# Patient Record
Sex: FEMALE | Race: WHITE | NOT HISPANIC OR LATINO | Employment: FULL TIME
[De-identification: names, ages, dates, MRNs, and addresses within clinical notes are randomized per-mention and may not be internally consistent; named-entity substitution may affect disease eponyms.]

---

## 2017-08-05 ENCOUNTER — HEALTH MAINTENANCE LETTER (OUTPATIENT)
Age: 47
End: 2017-08-05

## 2020-02-16 ENCOUNTER — HEALTH MAINTENANCE LETTER (OUTPATIENT)
Age: 50
End: 2020-02-16

## 2025-07-31 ENCOUNTER — ANESTHESIA (OUTPATIENT)
Dept: SURGERY | Facility: HOSPITAL | Age: 55
End: 2025-07-31
Payer: COMMERCIAL

## 2025-07-31 ENCOUNTER — ANESTHESIA EVENT (OUTPATIENT)
Dept: SURGERY | Facility: HOSPITAL | Age: 55
End: 2025-07-31
Payer: COMMERCIAL

## 2025-07-31 ENCOUNTER — HOSPITAL ENCOUNTER (INPATIENT)
Facility: HOSPITAL | Age: 55
End: 2025-07-31
Attending: SPECIALIST | Admitting: HOSPITALIST
Payer: COMMERCIAL

## 2025-07-31 VITALS
SYSTOLIC BLOOD PRESSURE: 117 MMHG | OXYGEN SATURATION: 95 % | BODY MASS INDEX: 33.29 KG/M2 | HEART RATE: 66 BPM | RESPIRATION RATE: 16 BRPM | WEIGHT: 212.08 LBS | HEIGHT: 67 IN | TEMPERATURE: 97.8 F | DIASTOLIC BLOOD PRESSURE: 56 MMHG

## 2025-07-31 DIAGNOSIS — K81.0 ACUTE CHOLECYSTITIS: Primary | ICD-10-CM

## 2025-07-31 DIAGNOSIS — K85.10 GALLSTONE PANCREATITIS: Primary | ICD-10-CM

## 2025-07-31 LAB
CREAT SERPL-MCNC: 0.69 MG/DL (ref 0.51–0.95)
EGFRCR SERPLBLD CKD-EPI 2021: >90 ML/MIN/1.73M2
MAGNESIUM SERPL-MCNC: 1.8 MG/DL (ref 1.7–2.3)
MAGNESIUM SERPL-MCNC: 1.8 MG/DL (ref 1.7–2.3)
POTASSIUM SERPL-SCNC: 3.6 MMOL/L (ref 3.4–5.3)

## 2025-07-31 PROCEDURE — 99222 1ST HOSP IP/OBS MODERATE 55: CPT | Mod: FS | Performed by: SPECIALIST

## 2025-07-31 PROCEDURE — 36415 COLL VENOUS BLD VENIPUNCTURE: CPT | Performed by: HOSPITALIST

## 2025-07-31 PROCEDURE — 250N000013 HC RX MED GY IP 250 OP 250 PS 637: Performed by: SPECIALIST

## 2025-07-31 PROCEDURE — 82565 ASSAY OF CREATININE: CPT | Performed by: HOSPITALIST

## 2025-07-31 PROCEDURE — 250N000011 HC RX IP 250 OP 636: Performed by: HOSPITALIST

## 2025-07-31 PROCEDURE — 258N000003 HC RX IP 258 OP 636: Performed by: HOSPITALIST

## 2025-07-31 PROCEDURE — 999N000141 HC STATISTIC PRE-PROCEDURE NURSING ASSESSMENT: Performed by: SPECIALIST

## 2025-07-31 PROCEDURE — 84132 ASSAY OF SERUM POTASSIUM: CPT | Performed by: HOSPITALIST

## 2025-07-31 PROCEDURE — 99207 PR APP CREDIT; MD BILLING SHARED VISIT: CPT

## 2025-07-31 PROCEDURE — 120N000001 HC R&B MED SURG/OB

## 2025-07-31 PROCEDURE — 99223 1ST HOSP IP/OBS HIGH 75: CPT | Performed by: HOSPITALIST

## 2025-07-31 PROCEDURE — 250N000013 HC RX MED GY IP 250 OP 250 PS 637: Performed by: HOSPITALIST

## 2025-07-31 PROCEDURE — 83735 ASSAY OF MAGNESIUM: CPT | Performed by: HOSPITALIST

## 2025-07-31 RX ORDER — CEFAZOLIN SODIUM/WATER 2 G/20 ML
2 SYRINGE (ML) INTRAVENOUS
Status: DISCONTINUED | OUTPATIENT
Start: 2025-07-31 | End: 2025-07-31 | Stop reason: HOSPADM

## 2025-07-31 RX ORDER — CEFAZOLIN SODIUM/WATER 2 G/20 ML
2 SYRINGE (ML) INTRAVENOUS SEE ADMIN INSTRUCTIONS
Status: DISCONTINUED | OUTPATIENT
Start: 2025-07-31 | End: 2025-07-31 | Stop reason: HOSPADM

## 2025-07-31 RX ORDER — HYDRALAZINE HYDROCHLORIDE 20 MG/ML
10 INJECTION INTRAMUSCULAR; INTRAVENOUS EVERY 4 HOURS PRN
Status: CANCELLED | OUTPATIENT
Start: 2025-07-31

## 2025-07-31 RX ORDER — HYDRALAZINE HYDROCHLORIDE 10 MG/1
10 TABLET, FILM COATED ORAL EVERY 4 HOURS PRN
Status: CANCELLED | OUTPATIENT
Start: 2025-07-31

## 2025-07-31 RX ORDER — AMOXICILLIN 250 MG
2 CAPSULE ORAL 2 TIMES DAILY PRN
Status: CANCELLED | OUTPATIENT
Start: 2025-07-31

## 2025-07-31 RX ORDER — AMOXICILLIN 250 MG
1 CAPSULE ORAL 2 TIMES DAILY PRN
Status: CANCELLED | OUTPATIENT
Start: 2025-07-31

## 2025-07-31 RX ORDER — AZELASTINE HYDROCHLORIDE 0.5 MG/ML
1 SOLUTION/ DROPS OPHTHALMIC 2 TIMES DAILY
Status: ON HOLD | COMMUNITY

## 2025-07-31 RX ORDER — NALOXONE HYDROCHLORIDE 0.4 MG/ML
0.2 INJECTION, SOLUTION INTRAMUSCULAR; INTRAVENOUS; SUBCUTANEOUS
Status: ACTIVE | OUTPATIENT
Start: 2025-07-31

## 2025-07-31 RX ORDER — ACETAMINOPHEN 325 MG/1
650 TABLET ORAL EVERY 4 HOURS PRN
Status: DISPENSED | OUTPATIENT
Start: 2025-07-31

## 2025-07-31 RX ORDER — SODIUM CHLORIDE, SODIUM LACTATE, POTASSIUM CHLORIDE, CALCIUM CHLORIDE 600; 310; 30; 20 MG/100ML; MG/100ML; MG/100ML; MG/100ML
INJECTION, SOLUTION INTRAVENOUS CONTINUOUS
Status: ACTIVE | OUTPATIENT
Start: 2025-07-31

## 2025-07-31 RX ORDER — HYDROMORPHONE HCL IN WATER/PF 6 MG/30 ML
0.2 PATIENT CONTROLLED ANALGESIA SYRINGE INTRAVENOUS
Refills: 0 | Status: ACTIVE | OUTPATIENT
Start: 2025-07-31

## 2025-07-31 RX ORDER — ACETAMINOPHEN 325 MG/1
650 TABLET ORAL EVERY 4 HOURS PRN
Status: CANCELLED | OUTPATIENT
Start: 2025-07-31

## 2025-07-31 RX ORDER — ACETAMINOPHEN 650 MG/1
650 SUPPOSITORY RECTAL EVERY 4 HOURS PRN
Status: ACTIVE | OUTPATIENT
Start: 2025-07-31

## 2025-07-31 RX ORDER — ONDANSETRON 4 MG/1
4 TABLET, ORALLY DISINTEGRATING ORAL EVERY 6 HOURS PRN
Status: CANCELLED | OUTPATIENT
Start: 2025-07-31

## 2025-07-31 RX ORDER — ACETAMINOPHEN 325 MG/1
975 TABLET ORAL ONCE
Status: DISCONTINUED | OUTPATIENT
Start: 2025-07-31 | End: 2025-07-31

## 2025-07-31 RX ORDER — NALOXONE HYDROCHLORIDE 0.4 MG/ML
0.1 INJECTION, SOLUTION INTRAMUSCULAR; INTRAVENOUS; SUBCUTANEOUS
Status: DISCONTINUED | OUTPATIENT
Start: 2025-07-31 | End: 2025-07-31 | Stop reason: HOSPADM

## 2025-07-31 RX ORDER — ONDANSETRON 4 MG/1
4 TABLET, ORALLY DISINTEGRATING ORAL EVERY 30 MIN PRN
Status: DISCONTINUED | OUTPATIENT
Start: 2025-07-31 | End: 2025-07-31 | Stop reason: HOSPADM

## 2025-07-31 RX ORDER — NALOXONE HYDROCHLORIDE 0.4 MG/ML
0.4 INJECTION, SOLUTION INTRAMUSCULAR; INTRAVENOUS; SUBCUTANEOUS
Status: ACTIVE | OUTPATIENT
Start: 2025-07-31

## 2025-07-31 RX ORDER — ONDANSETRON 2 MG/ML
4 INJECTION INTRAMUSCULAR; INTRAVENOUS EVERY 6 HOURS PRN
Status: ACTIVE | OUTPATIENT
Start: 2025-07-31

## 2025-07-31 RX ORDER — ACETAMINOPHEN 325 MG/1
975 TABLET ORAL ONCE
Status: COMPLETED | OUTPATIENT
Start: 2025-07-31 | End: 2025-07-31

## 2025-07-31 RX ORDER — ONDANSETRON 2 MG/ML
4 INJECTION INTRAMUSCULAR; INTRAVENOUS EVERY 6 HOURS PRN
Status: CANCELLED | OUTPATIENT
Start: 2025-07-31

## 2025-07-31 RX ORDER — CEFTRIAXONE 1 G/1
1 INJECTION, POWDER, FOR SOLUTION INTRAMUSCULAR; INTRAVENOUS EVERY 24 HOURS
Status: ACTIVE | OUTPATIENT
Start: 2025-08-01

## 2025-07-31 RX ORDER — METRONIDAZOLE 500 MG/100ML
500 INJECTION, SOLUTION INTRAVENOUS EVERY 12 HOURS
Status: DISPENSED | OUTPATIENT
Start: 2025-07-31

## 2025-07-31 RX ORDER — SODIUM CHLORIDE, SODIUM LACTATE, POTASSIUM CHLORIDE, CALCIUM CHLORIDE 600; 310; 30; 20 MG/100ML; MG/100ML; MG/100ML; MG/100ML
INJECTION, SOLUTION INTRAVENOUS CONTINUOUS
Status: DISCONTINUED | OUTPATIENT
Start: 2025-07-31 | End: 2025-07-31 | Stop reason: HOSPADM

## 2025-07-31 RX ORDER — HYDRALAZINE HYDROCHLORIDE 20 MG/ML
10 INJECTION INTRAMUSCULAR; INTRAVENOUS EVERY 4 HOURS PRN
Status: ACTIVE | OUTPATIENT
Start: 2025-07-31

## 2025-07-31 RX ORDER — CALCIUM CARBONATE 500 MG/1
1000 TABLET, CHEWABLE ORAL 2 TIMES DAILY PRN
Status: ACTIVE | OUTPATIENT
Start: 2025-07-31

## 2025-07-31 RX ORDER — HYDRALAZINE HYDROCHLORIDE 10 MG/1
10 TABLET, FILM COATED ORAL EVERY 4 HOURS PRN
Status: ACTIVE | OUTPATIENT
Start: 2025-07-31

## 2025-07-31 RX ORDER — HYDROMORPHONE HCL IN WATER/PF 6 MG/30 ML
0.4 PATIENT CONTROLLED ANALGESIA SYRINGE INTRAVENOUS EVERY 5 MIN PRN
Status: DISCONTINUED | OUTPATIENT
Start: 2025-07-31 | End: 2025-07-31 | Stop reason: HOSPADM

## 2025-07-31 RX ORDER — PROCHLORPERAZINE MALEATE 10 MG
10 TABLET ORAL EVERY 6 HOURS PRN
Status: CANCELLED | OUTPATIENT
Start: 2025-07-31

## 2025-07-31 RX ORDER — ACETAMINOPHEN 650 MG/1
650 SUPPOSITORY RECTAL EVERY 4 HOURS PRN
Status: CANCELLED | OUTPATIENT
Start: 2025-07-31

## 2025-07-31 RX ORDER — FENTANYL CITRATE 50 UG/ML
50 INJECTION, SOLUTION INTRAMUSCULAR; INTRAVENOUS EVERY 5 MIN PRN
Status: DISCONTINUED | OUTPATIENT
Start: 2025-07-31 | End: 2025-07-31 | Stop reason: HOSPADM

## 2025-07-31 RX ORDER — ONDANSETRON 2 MG/ML
4 INJECTION INTRAMUSCULAR; INTRAVENOUS EVERY 30 MIN PRN
Status: DISCONTINUED | OUTPATIENT
Start: 2025-07-31 | End: 2025-07-31 | Stop reason: HOSPADM

## 2025-07-31 RX ORDER — DEXAMETHASONE SODIUM PHOSPHATE 10 MG/ML
4 INJECTION, SOLUTION INTRAMUSCULAR; INTRAVENOUS
Status: DISCONTINUED | OUTPATIENT
Start: 2025-07-31 | End: 2025-07-31 | Stop reason: HOSPADM

## 2025-07-31 RX ORDER — HYDROMORPHONE HCL IN WATER/PF 6 MG/30 ML
0.4 PATIENT CONTROLLED ANALGESIA SYRINGE INTRAVENOUS
Refills: 0 | Status: ACTIVE | OUTPATIENT
Start: 2025-07-31

## 2025-07-31 RX ORDER — FENTANYL CITRATE 50 UG/ML
25 INJECTION, SOLUTION INTRAMUSCULAR; INTRAVENOUS EVERY 5 MIN PRN
Status: DISCONTINUED | OUTPATIENT
Start: 2025-07-31 | End: 2025-07-31 | Stop reason: HOSPADM

## 2025-07-31 RX ORDER — ONDANSETRON 4 MG/1
4 TABLET, ORALLY DISINTEGRATING ORAL EVERY 6 HOURS PRN
Status: ACTIVE | OUTPATIENT
Start: 2025-07-31

## 2025-07-31 RX ORDER — ACETAMINOPHEN 325 MG/1
975 TABLET ORAL ONCE
Status: CANCELLED | OUTPATIENT
Start: 2025-07-31 | End: 2025-07-31

## 2025-07-31 RX ORDER — HYDROMORPHONE HCL IN WATER/PF 6 MG/30 ML
0.2 PATIENT CONTROLLED ANALGESIA SYRINGE INTRAVENOUS EVERY 5 MIN PRN
Status: DISCONTINUED | OUTPATIENT
Start: 2025-07-31 | End: 2025-07-31 | Stop reason: HOSPADM

## 2025-07-31 RX ORDER — LIDOCAINE 40 MG/G
CREAM TOPICAL
Status: ACTIVE | OUTPATIENT
Start: 2025-07-31

## 2025-07-31 RX ADMIN — SODIUM CHLORIDE, SODIUM LACTATE, POTASSIUM CHLORIDE, AND CALCIUM CHLORIDE 500 ML: .6; .31; .03; .02 INJECTION, SOLUTION INTRAVENOUS at 18:04

## 2025-07-31 RX ADMIN — SODIUM CHLORIDE, SODIUM LACTATE, POTASSIUM CHLORIDE, AND CALCIUM CHLORIDE: .6; .31; .03; .02 INJECTION, SOLUTION INTRAVENOUS at 14:00

## 2025-07-31 RX ADMIN — METRONIDAZOLE 500 MG: 500 INJECTION, SOLUTION INTRAVENOUS at 19:08

## 2025-07-31 RX ADMIN — ACETAMINOPHEN 975 MG: 325 TABLET ORAL at 14:28

## 2025-07-31 RX ADMIN — ACETAMINOPHEN 650 MG: 325 TABLET ORAL at 21:05

## 2025-07-31 RX ADMIN — SODIUM CHLORIDE, SODIUM LACTATE, POTASSIUM CHLORIDE, AND CALCIUM CHLORIDE: .6; .31; .03; .02 INJECTION, SOLUTION INTRAVENOUS at 23:59

## 2025-07-31 ASSESSMENT — ACTIVITIES OF DAILY LIVING (ADL)
ADLS_ACUITY_SCORE: 18
ADLS_ACUITY_SCORE: 41
ADLS_ACUITY_SCORE: 18
ADLS_ACUITY_SCORE: 41
ADLS_ACUITY_SCORE: 18
ADLS_ACUITY_SCORE: 41
ADLS_ACUITY_SCORE: 41
ADLS_ACUITY_SCORE: 18
ADLS_ACUITY_SCORE: 41
ADLS_ACUITY_SCORE: 41

## 2025-07-31 NOTE — PHARMACY-ADMISSION MEDICATION HISTORY
Pharmacist Admission Medication History    Admission medication history is complete. The information provided in this note is only as accurate as the sources available at the time of the update.    Information Source(s): Patient, Clinic records, and CareEverywhere/SureScripts via in-person    Pertinent Information: Patient was able to confirm current medications and last known administration times    Changes made to PTA medication list:  Added: None  Deleted: None  Changed: None    Allergies reviewed with patient and updates made in EHR: yes    Medication History Completed By: Darrell Hackett RP 7/31/2025 1:41 PM    PTA Med List   Medication Sig Last Dose/Taking    azelastine (OPTIVAR) 0.05 % ophthalmic solution Place 1 drop into both eyes 2 times daily. 7/31/2025 Morning

## 2025-07-31 NOTE — H&P
"Admission History and Physical   Katie Harvey,  1970, MRN [id number]    [facility]    PCP: [facility], [phone]          Extended Emergency Contact Information  Primary Emergency Contact: nyla riziv  Address: [facility] [address] [place] [address] [facility]  Home Phone: [phone]  Mobile Phone: [phone]  Relation: Spouse  Secondary Emergency Contact: none per pt   [place]  Relation: None       Assessment and Plan     55F with gallstone pancreatitis, and likely cholecystitis and choledocholithiasis.      #Gallstone Pancreatitis  #Possible cholecystitis and choledocholithiasis.   -ceftriaxone and flagyl (PCN allergy), LR, IVF, NPO, pain control.    -currently in PACU.  Tiera with IOC or ERCP per Sx and GI.    #Incidental finding - thickened uterine endometrium.  Outpatient follow-up with GYN.      Checklist:  Code Status:  full  Diet: NPO for Procedure/Surgery per Anesthesia Guidelines      Mantilla Catheter: Not present  Lines: None     DVT px:  Pneumatic Compression Devices   Page 1 of 1        Auto-populated based on system request - if needs to be addressed in treatment plan they will be addressed above:  \"  Clinically Significant Risk Factors Present on Admission                               # Obesity: Estimated body mass index is 33.23 kg/m  as calculated from the following:    Height as of this encounter: 1.689 m (5' 6.5\").    Weight as of this encounter: 94.8 kg (209 lb).         # Asthma: noted on problem list          \"  Advanced Care Planning  I anticipate the patient will be admitted to the hospital for at least 2 midnights for the evaluation and treatment of the conditions discussed above.     Chief Complaint: Abdominal pain     HPI:    Katie Harvey is a 55F who presents with epigastric abdominal discomfort, nausea, vomiting that started last night.  Pain persisted and she presented to the [facility] room where she was " "found to have found to have elevated LFTs (T.bili 1.4, Alk phos 162, , ) and markedly elevated lipase (20k).  CT with contrast and US with mild CBD dilation and possible cholecystitis.   Received a dose of ceftraixone.  No CT evidence for pancreatitis reported despite markedly elevated lipase.  Accepted for admission from [facility] room and was admitted directly to pre-op.     History is provided by patient       Physical Exam:  Temp:  [98  F (36.7  C)] 98  F (36.7  C)  Pulse:  [103] 103  Resp:  [20] 20  BP: (139)/(88) 139/88  SpO2:  [96 %] 96 %  /88   Pulse 103   Temp 98  F (36.7  C)   Resp 20   Ht 1.689 m (5' 6.5\")   Wt 94.8 kg (209 lb)   LMP  (Within Years)   SpO2 96%   BMI 33.23 kg/m       General:  Alert, cooperative, no distress,  Appears stated age  Neurologic:  oriented, facialsymmetry preserved, fluent speech.   Psych: calm, mood and affect appropriate to situation  HEENT:  Anicteric, MMM, unremarkable dentition  CV: regular tachycardia.  no edema  Lungs: CTAB.  Easyrespirations  Abd: RUQ and epigastric TTP  Skin: no rashes noted on exposed skin.   Central Lines and Tubes: None (no ch, CVC, feeding tubes)         Pertinent Test Findings  Radiology Results (results reviewed):   No results found for this visit on 25.        Medical History  Past Medical History:   Diagnosis Date    Abnormal Pap smear of cervix     s/p colposcopy    Asthma     exercise induced    Bleeding ulcer     Blood transfusion     related to PUD    Chiari malformation type I (H)     Chickenpox     age 15    Depression     Eating disorders     Bulemia in high school    Epidural hematoma (H)     s/p head trauma, no surgery    Infertility management     Thyroid dysfunction     Currently not on medications        Surgical History  Past Surgical History:   Procedure Laterality Date     SECTION  2012    Procedure: SECTION; Surgeon:IBRAHIMA MAE; Location:[facility]   "    MOUTH SURGERY      wisdom teeth          Social History  Social History     Tobacco Use    Smoking status: Never    Smokeless tobacco: Never   Substance Use Topics    Alcohol use: Yes     Comment:  1 glass of wine monthly    Drug use: No          Allergies  Allergies   Allergen Reactions    Pcn [Penicillins] Other (See Comments)     Childhood reaction    Sulfa Antibiotics Rash    Family History  I have reviewed this patient's family history and updated it with pertinent information if needed.  Family History   Problem Relation Age of Onset    Alcohol/Drug Maternal Grandmother     Diabetes Mother     Cardiovascular Mother         MI-    Hypertension Mother     Cerebrovascular Disease Mother     C.A.D. Mother 59    Gastrointestinal Disease Paternal Grandfather         ulcer    Cardiovascular Paternal Grandfather         MI    Cardiovascular Father         MI    Hypertension Father     Cerebrovascular Disease Father     C.A.D. Father     Neurologic Disorder Father 30        Brain aneuysm                 Prior to Admission Medications   Prior to Admission Medications   Prescriptions Last Dose Informant Patient Reported? Taking?   azelastine (OPTIVAR) 0.05 % ophthalmic solution 2025 Morning  Yes Yes   Sig: Place 1 drop into both eyes 2 times daily.      Facility-Administered Medications: None              Pertinent Labs    Most Recent 3 CBC's:No lab results found.  Most Recent 3 BMP's:No lab results found.  Most Recent 2 LFT's:No lab results found.  Most Recent 3 INR's:No lab results found.  Most Recent 3 Troponin's:No lab results found.    Medical Decision Making        Medical Decision Making               Yasmeen Rubin MD, [facility]  Internal Medicine Hospitalist  2025

## 2025-07-31 NOTE — CONSULTS
General Surgery Consultation  Katie Harvey MRN# [id number]   Age/Sex: 55 year old female YOB: 1970     Reason for consult: No diagnosis found.                            Assessment and Plan:   Assessment:  Katie Harvey is a 55 yoF with medical history of peptic ulcer disease and surgical history of  section who is presenting due to abdominal pain associated with nausea and vomiting referred from urgent care due to gallstone pancreatitis.    Labs notable for mild leukocytosis measuring 11.2, hyperbilirubinemia with T. bili measuring 1.4 and transaminitis.  Lipase levels measuring 20,261.  CT scan obtained revealing acute cholecystitis with mild extrahepatic biliary dilation.  US with mild gallbladder distention without gallstones or wall thickening.     In the setting of exceedingly high lipase levels, recommend holding off on laparoscopic cholecystectomy until labs and pain improve. Recommend mgmt for pancreatitis with aggressive fluid resuscitation, pain control. GI consult order placed.     Plan:  -NPO diet  -Aggressive IVF resuscitation  -Multimodal pain control  -PRN antiemetics  -No surgical plans at this time  -LFT, lipase lab trend  -Surgery to follow           Chief Complaint:   No chief complaint on file.       History is obtained from the patient and EMR    HPI:   Katie Harvey is a 55 yoF with medical history of peptic ulcer disease and surgical history of  section who is presenting due to abdominal pain associated with nausea and vomiting referred from urgent care due to gallstone pancreatitis.    States pain yesterday evening began with epigastric mid abdominal pain associated with nausea vomiting x 2, nonbloody. Reports that occurred following dinnertime.  Denies associated fever chills sensation or previous similar episodes.  History of peptic ulcer disease, not currently on a PPI medication.  Surgical history of  section.  Denies use of blood  thinning medications.          Past Medical History:     Past Medical History:   Diagnosis Date    Abnormal Pap smear of cervix     s/p colposcopy    Asthma     exercise induced    Bleeding ulcer 2002    Blood transfusion 2002    related to PUD    Chiari malformation type I (H)     Chickenpox     age 15    Depression     Eating disorders     Bulemia in high school    Epidural hematoma (H)     s/p head trauma, no surgery    Infertility management     Thyroid dysfunction     Currently not on medications              Past Surgical History:     Past Surgical History:   Procedure Laterality Date     SECTION  2012    Procedure: SECTION; Surgeon:IBRAHIMA MAE; Location:[facility]      MOUTH SURGERY      wisdom teeth             Social History:    reports that she has never smoked. She has never used smokeless tobacco. She reports current alcohol use. She reports that she does not use drugs.           Family History:     Family History   Problem Relation Age of Onset    Alcohol/Drug Maternal Grandmother     Diabetes Mother     Cardiovascular Mother         MI-    Hypertension Mother     Cerebrovascular Disease Mother     C.A.D. Mother 59    Gastrointestinal Disease Paternal Grandfather         ulcer    Cardiovascular Paternal Grandfather         MI    Cardiovascular Father         MI    Hypertension Father     Cerebrovascular Disease Father     C.A.D. Father     Neurologic Disorder Father 30        Brain aneuysm              Allergies:     Allergies   Allergen Reactions    Pcn [Penicillins] Other (See Comments)     Childhood reaction    Sulfa Antibiotics Rash              Medications:     Prior to Admission medications   Medication Sig Start Date End Date Taking? Authorizing Provider   azelastine (OPTIVAR) 0.05 % ophthalmic solution Place 1 drop into both eyes 2 times daily.   Yes Unknown, Entered By History              Review of Systems:   The Review of Systems is negative other  "than noted in the HPI            Physical Exam:   Patient Vitals for the past 24 hrs:   BP Temp Pulse Resp SpO2 Height Weight   07/31/25 1327 139/88 98  F (36.7  C) 103 20 96 % 1.689 m (5' 6.5\") 94.8 kg (209 lb)        No intake or output data in the 24 hours ending 07/31/25 1409   Constitutional:   awake, alert, cooperative, no apparent distress, and appears stated age       Eyes:   PERRL, conjunctiva/corneas clear, EOM's intact; no scleral edema or icterus noted        ENT:   Normocephalic, without obvious abnormality, atraumatic, Lips, mucosa, and tongue normal      Lungs:   Normal respiratory effort, no accessory muscle use       Cardiovascular:   Regular rate and rhythm       Abdomen:   Soft, non distended with mild epigastric tenderness to palpation without associated rebound or guarding.       Musculoskeletal:   No obvious swelling, bruising or deformity       Skin:   Skin color and texture normal for patient, no rashes or lesions              Data:         All imaging studies reviewed by me.    Recent Results (from the past 24 hours)   CT Abdomen Pelvis w Contrast    Narrative    For Patients: As a result of the 21st Century Cures Act, medical imaging exams and procedure reports are released immediately into your electronic medical record. You may view this report before your referring provider. If you have questions, please contact your health care provider.    EXAM: CT ABDOMEN PELVIS W  LOCATION: [facility] [place]  DATE: 7/31/2025    INDICATION: lower abdominal pain  COMPARISON: None.  TECHNIQUE: CT scan of the abdomen and pelvis was performed following injection of IV contrast. Multiplanar reformats were obtained. Dose reduction techniques were used.  CONTRAST: IOPAMIDOL 300 MG/ML  ML BOTTLE: 100mL    FINDINGS:   LOWER CHEST: Normal.    HEPATOBILIARY: Distended gallbladder with mild wall thickening and moderate surrounding edema. No radiodense gallstone. Mild extrahepatic biliary " ductal dilatation with the common duct measuring 9 mm in diameter. No discrete well-defined obstructing intraductal stone is identified. Normal liver.    PANCREAS: Mild ectasia of the distal main pancreatic duct measuring 3 mm in diameter. No obstructing stone or mass is identified.    SPLEEN: Normal.    ADRENAL GLANDS: Normal.    KIDNEYS/BLADDER: Small bilateral renal cortical cysts. No hydronephrosis or hydroureter. Normal bladder.    BOWEL: No bowel obstruction or inflammatory process. Normal appendix. Colonic diverticulosis without evidence of acute diverticulitis. No free air or free fluid.    LYMPH NODES: Normal.    VASCULATURE: Normal.    PELVIC ORGANS: Abnormal thickened uterine endometrium measuring 19 mm in thickness. No pelvic free fluid.    MUSCULOSKELETAL: Incidental pelvic bone islands. Bilateral L5 pars defects. Spinal degenerative changes. Small fat-containing umbilical hernia.    Impression    1.  Distended gallbladder with mild wall thickening and mild surrounding edema. These findings are suspicious for acute cholecystitis. Further evaluation with ultrasound may be helpful.  2.  Mild extrahepatic biliary ductal dilatation. No discrete obstructing intraductal stone. Follow-up with MRCP may be helpful.  3.  Abnormal thickened uterine endometrium. Given the patient's age, differential considerations include endometrial hyperplasia, an endometrial polyp and endometrial carcinoma. Recommend nonemergent further evaluation with pelvic ultrasound.    Actionable incidental finding. Suggested follow-up: Pelvic ultrasound  US Pelvis < 1 Month   US Abdomen Limited    Narrative    For Patients: As a result of the 21st Century Cures Act, medical imaging exams and procedure reports are released immediately into your electronic medical record. You may view this report before your referring provider. If you have questions, please contact your health care provider.    EXAM: US ABDOMEN LIMITED GALLBLADDER  LOCATION:  [facility] [place]  DATE: 7/31/2025    INDICATION: Abdomen pain  COMPARISON: CT 7/31/25 at 0905 hours  TECHNIQUE: Limited abdominal ultrasound.    FINDINGS:    GALLBLADDER: Mildly distended. No gallstones. No gallbladder wall thickening. No sonographic Freeman sign.    BILE DUCTS: No intrahepatic biliary dilatation. The common duct measures 9 mm.    LIVER: Normal parenchyma with smooth contour. No focal mass.    RIGHT KIDNEY: No hydronephrosis.    PANCREAS: The visualized portions are normal.    No ascites.    Impression    1.  Mildly distended gallbladder. No gallstones or wall thickening. No sonographic Freeman sign. Cholecystitis is possible.  2.  The common bile duct is dilated.   3.  Consider further evaluation with nuclear medicine hepatobiliary imaging or MRI/MRCP.           Chanel Benedict PA-C  [facility]  Surgery [address] [address]?  Office: [phone]

## 2025-08-01 ENCOUNTER — APPOINTMENT (OUTPATIENT)
Dept: RADIOLOGY | Facility: HOSPITAL | Age: 55
End: 2025-08-01
Attending: SPECIALIST
Payer: COMMERCIAL

## 2025-08-01 VITALS
OXYGEN SATURATION: 92 % | HEIGHT: 67 IN | DIASTOLIC BLOOD PRESSURE: 63 MMHG | WEIGHT: 212.08 LBS | TEMPERATURE: 97.8 F | HEART RATE: 73 BPM | RESPIRATION RATE: 16 BRPM | SYSTOLIC BLOOD PRESSURE: 135 MMHG | BODY MASS INDEX: 33.29 KG/M2

## 2025-08-01 PROBLEM — F32.A DEPRESSION: Status: ACTIVE | Noted: 2025-08-01

## 2025-08-01 LAB
ALBUMIN SERPL BCG-MCNC: 3.5 G/DL (ref 3.5–5.2)
ALP SERPL-CCNC: 122 U/L (ref 40–150)
ALT SERPL W P-5'-P-CCNC: 94 U/L (ref 0–50)
ANION GAP SERPL CALCULATED.3IONS-SCNC: 10 MMOL/L (ref 7–15)
AST SERPL W P-5'-P-CCNC: 68 U/L (ref 0–45)
BILIRUB DIRECT SERPL-MCNC: 0.33 MG/DL (ref 0–0.3)
BILIRUB SERPL-MCNC: 1.3 MG/DL
BUN SERPL-MCNC: 12.8 MG/DL (ref 6–20)
CALCIUM SERPL-MCNC: 9.2 MG/DL (ref 8.8–10.4)
CHLORIDE SERPL-SCNC: 104 MMOL/L (ref 98–107)
CREAT SERPL-MCNC: 0.69 MG/DL (ref 0.51–0.95)
EGFRCR SERPLBLD CKD-EPI 2021: >90 ML/MIN/1.73M2
ERYTHROCYTE [DISTWIDTH] IN BLOOD BY AUTOMATED COUNT: 13.5 % (ref 10–15)
GLUCOSE SERPL-MCNC: 95 MG/DL (ref 70–99)
HCO3 SERPL-SCNC: 26 MMOL/L (ref 22–29)
HCT VFR BLD AUTO: 38.3 % (ref 35–47)
HGB BLD-MCNC: 12.6 G/DL (ref 11.7–15.7)
LIPASE SERPL-CCNC: 241 U/L (ref 13–60)
MAGNESIUM SERPL-MCNC: 1.9 MG/DL (ref 1.7–2.3)
MCH RBC QN AUTO: 28.7 PG (ref 26.5–33)
MCHC RBC AUTO-ENTMCNC: 32.9 G/DL (ref 31.5–36.5)
MCV RBC AUTO: 87 FL (ref 78–100)
PLATELET # BLD AUTO: 248 10E3/UL (ref 150–450)
POTASSIUM SERPL-SCNC: 3.8 MMOL/L (ref 3.4–5.3)
PROT SERPL-MCNC: 6.7 G/DL (ref 6.4–8.3)
RBC # BLD AUTO: 4.39 10E6/UL (ref 3.8–5.2)
SODIUM SERPL-SCNC: 140 MMOL/L (ref 135–145)
WBC # BLD AUTO: 8.1 10E3/UL (ref 4–11)

## 2025-08-01 PROCEDURE — 258N000003 HC RX IP 258 OP 636: Performed by: HOSPITALIST

## 2025-08-01 PROCEDURE — 85027 COMPLETE CBC AUTOMATED: CPT

## 2025-08-01 PROCEDURE — 250N000009 HC RX 250: Performed by: SPECIALIST

## 2025-08-01 PROCEDURE — 258N000003 HC RX IP 258 OP 636: Performed by: ANESTHESIOLOGY

## 2025-08-01 PROCEDURE — 36415 COLL VENOUS BLD VENIPUNCTURE: CPT

## 2025-08-01 PROCEDURE — 255N000002 HC RX 255 OP 636: Performed by: SPECIALIST

## 2025-08-01 PROCEDURE — 250N000011 HC RX IP 250 OP 636: Performed by: HOSPITALIST

## 2025-08-01 PROCEDURE — BF10YZZ FLUOROSCOPY OF BILE DUCTS USING OTHER CONTRAST: ICD-10-PCS | Performed by: SPECIALIST

## 2025-08-01 PROCEDURE — 250N000025 HC SEVOFLURANE, PER MIN: Performed by: SPECIALIST

## 2025-08-01 PROCEDURE — 710N000012 HC RECOVERY PHASE 2, PER MINUTE: Performed by: SPECIALIST

## 2025-08-01 PROCEDURE — 82248 BILIRUBIN DIRECT: CPT

## 2025-08-01 PROCEDURE — 258N000003 HC RX IP 258 OP 636: Performed by: SPECIALIST

## 2025-08-01 PROCEDURE — 999N000180 XR SURGERY CARM FLUORO LESS THAN 5 MIN

## 2025-08-01 PROCEDURE — 999N000141 HC STATISTIC PRE-PROCEDURE NURSING ASSESSMENT: Performed by: SPECIALIST

## 2025-08-01 PROCEDURE — 370N000017 HC ANESTHESIA TECHNICAL FEE, PER MIN: Performed by: SPECIALIST

## 2025-08-01 PROCEDURE — 250N000013 HC RX MED GY IP 250 OP 250 PS 637: Performed by: HOSPITALIST

## 2025-08-01 PROCEDURE — 250N000011 HC RX IP 250 OP 636: Performed by: SPECIALIST

## 2025-08-01 PROCEDURE — 88304 TISSUE EXAM BY PATHOLOGIST: CPT | Mod: 26 | Performed by: PATHOLOGY

## 2025-08-01 PROCEDURE — 272N000001 HC OR GENERAL SUPPLY STERILE: Performed by: SPECIALIST

## 2025-08-01 PROCEDURE — 88304 TISSUE EXAM BY PATHOLOGIST: CPT | Mod: TC | Performed by: SPECIALIST

## 2025-08-01 PROCEDURE — 0FT44ZZ RESECTION OF GALLBLADDER, PERCUTANEOUS ENDOSCOPIC APPROACH: ICD-10-PCS | Performed by: SPECIALIST

## 2025-08-01 PROCEDURE — 250N000013 HC RX MED GY IP 250 OP 250 PS 637: Performed by: ANESTHESIOLOGY

## 2025-08-01 PROCEDURE — 83735 ASSAY OF MAGNESIUM: CPT | Performed by: HOSPITALIST

## 2025-08-01 PROCEDURE — 360N000083 HC SURGERY LEVEL 3 W/ FLUORO, PER MIN: Performed by: SPECIALIST

## 2025-08-01 PROCEDURE — 47563 LAPARO CHOLECYSTECTOMY/GRAPH: CPT | Performed by: SPECIALIST

## 2025-08-01 PROCEDURE — 99232 SBSQ HOSP IP/OBS MODERATE 35: CPT | Mod: 57

## 2025-08-01 PROCEDURE — 83690 ASSAY OF LIPASE: CPT

## 2025-08-01 PROCEDURE — 710N000009 HC RECOVERY PHASE 1, LEVEL 1, PER MIN: Performed by: SPECIALIST

## 2025-08-01 PROCEDURE — 99238 HOSP IP/OBS DSCHRG MGMT 30/<: CPT | Mod: FS | Performed by: INTERNAL MEDICINE

## 2025-08-01 RX ORDER — SODIUM CHLORIDE, SODIUM LACTATE, POTASSIUM CHLORIDE, CALCIUM CHLORIDE 600; 310; 30; 20 MG/100ML; MG/100ML; MG/100ML; MG/100ML
INJECTION, SOLUTION INTRAVENOUS CONTINUOUS
Status: DISCONTINUED | OUTPATIENT
Start: 2025-08-01 | End: 2025-08-01 | Stop reason: HOSPADM

## 2025-08-01 RX ORDER — OXYCODONE HYDROCHLORIDE 5 MG/1
5 TABLET ORAL
Status: COMPLETED | OUTPATIENT
Start: 2025-08-01 | End: 2025-08-01

## 2025-08-01 RX ORDER — MAGNESIUM HYDROXIDE 1200 MG/15ML
LIQUID ORAL PRN
Status: DISCONTINUED | OUTPATIENT
Start: 2025-08-01 | End: 2025-08-01 | Stop reason: HOSPADM

## 2025-08-01 RX ORDER — NALOXONE HYDROCHLORIDE 0.4 MG/ML
0.1 INJECTION, SOLUTION INTRAMUSCULAR; INTRAVENOUS; SUBCUTANEOUS
Status: DISCONTINUED | OUTPATIENT
Start: 2025-08-01 | End: 2025-08-01 | Stop reason: HOSPADM

## 2025-08-01 RX ORDER — HALOPERIDOL 5 MG/ML
2 INJECTION INTRAMUSCULAR
Status: DISCONTINUED | OUTPATIENT
Start: 2025-08-01 | End: 2025-08-01 | Stop reason: HOSPADM

## 2025-08-01 RX ORDER — ACETAMINOPHEN 325 MG/1
975 TABLET ORAL
Status: DISCONTINUED | OUTPATIENT
Start: 2025-08-01 | End: 2025-08-01 | Stop reason: HOSPADM

## 2025-08-01 RX ORDER — ACETAMINOPHEN 325 MG/1
975 TABLET ORAL ONCE
Status: DISCONTINUED | OUTPATIENT
Start: 2025-08-01 | End: 2025-08-01 | Stop reason: HOSPADM

## 2025-08-01 RX ORDER — BUPIVACAINE HYDROCHLORIDE 2.5 MG/ML
INJECTION, SOLUTION INFILTRATION; PERINEURAL PRN
Status: DISCONTINUED | OUTPATIENT
Start: 2025-08-01 | End: 2025-08-01 | Stop reason: HOSPADM

## 2025-08-01 RX ORDER — HYDROMORPHONE HCL IN WATER/PF 6 MG/30 ML
0.2 PATIENT CONTROLLED ANALGESIA SYRINGE INTRAVENOUS EVERY 5 MIN PRN
Status: DISCONTINUED | OUTPATIENT
Start: 2025-08-01 | End: 2025-08-01 | Stop reason: HOSPADM

## 2025-08-01 RX ORDER — MEPERIDINE HYDROCHLORIDE 25 MG/ML
12.5 INJECTION INTRAMUSCULAR; INTRAVENOUS; SUBCUTANEOUS EVERY 5 MIN PRN
Status: DISCONTINUED | OUTPATIENT
Start: 2025-08-01 | End: 2025-08-01 | Stop reason: HOSPADM

## 2025-08-01 RX ORDER — SODIUM CHLORIDE, SODIUM LACTATE, POTASSIUM CHLORIDE, AND CALCIUM CHLORIDE .6; .31; .03; .02 G/100ML; G/100ML; G/100ML; G/100ML
IRRIGANT IRRIGATION PRN
Status: DISCONTINUED | OUTPATIENT
Start: 2025-08-01 | End: 2025-08-01 | Stop reason: HOSPADM

## 2025-08-01 RX ORDER — HYDROMORPHONE HCL IN WATER/PF 6 MG/30 ML
0.4 PATIENT CONTROLLED ANALGESIA SYRINGE INTRAVENOUS EVERY 5 MIN PRN
Status: DISCONTINUED | OUTPATIENT
Start: 2025-08-01 | End: 2025-08-01 | Stop reason: HOSPADM

## 2025-08-01 RX ORDER — ONDANSETRON 2 MG/ML
4 INJECTION INTRAMUSCULAR; INTRAVENOUS EVERY 30 MIN PRN
Status: DISCONTINUED | OUTPATIENT
Start: 2025-08-01 | End: 2025-08-01 | Stop reason: HOSPADM

## 2025-08-01 RX ORDER — LIDOCAINE 40 MG/G
CREAM TOPICAL
Status: DISCONTINUED | OUTPATIENT
Start: 2025-08-01 | End: 2025-08-01 | Stop reason: HOSPADM

## 2025-08-01 RX ORDER — HYDROXYZINE HYDROCHLORIDE 25 MG/1
25 TABLET, FILM COATED ORAL EVERY 6 HOURS PRN
Status: DISCONTINUED | OUTPATIENT
Start: 2025-08-01 | End: 2025-08-01 | Stop reason: HOSPADM

## 2025-08-01 RX ORDER — HYDROCODONE BITARTRATE AND ACETAMINOPHEN 5; 325 MG/1; MG/1
1 TABLET ORAL
Status: DISCONTINUED | OUTPATIENT
Start: 2025-08-01 | End: 2025-08-01 | Stop reason: HOSPADM

## 2025-08-01 RX ORDER — FENTANYL CITRATE 50 UG/ML
25 INJECTION, SOLUTION INTRAMUSCULAR; INTRAVENOUS EVERY 5 MIN PRN
Status: DISCONTINUED | OUTPATIENT
Start: 2025-08-01 | End: 2025-08-01 | Stop reason: HOSPADM

## 2025-08-01 RX ORDER — ALBUTEROL SULFATE 0.83 MG/ML
2.5 SOLUTION RESPIRATORY (INHALATION) EVERY 4 HOURS PRN
Status: DISCONTINUED | OUTPATIENT
Start: 2025-08-01 | End: 2025-08-01 | Stop reason: HOSPADM

## 2025-08-01 RX ORDER — DEXAMETHASONE SODIUM PHOSPHATE 4 MG/ML
4 INJECTION, SOLUTION INTRA-ARTICULAR; INTRALESIONAL; INTRAMUSCULAR; INTRAVENOUS; SOFT TISSUE
Status: DISCONTINUED | OUTPATIENT
Start: 2025-08-01 | End: 2025-08-01 | Stop reason: HOSPADM

## 2025-08-01 RX ORDER — ONDANSETRON 4 MG/1
4 TABLET, ORALLY DISINTEGRATING ORAL EVERY 30 MIN PRN
Status: DISCONTINUED | OUTPATIENT
Start: 2025-08-01 | End: 2025-08-01 | Stop reason: HOSPADM

## 2025-08-01 RX ORDER — FENTANYL CITRATE 50 UG/ML
50 INJECTION, SOLUTION INTRAMUSCULAR; INTRAVENOUS EVERY 5 MIN PRN
Status: DISCONTINUED | OUTPATIENT
Start: 2025-08-01 | End: 2025-08-01 | Stop reason: HOSPADM

## 2025-08-01 RX ORDER — OXYCODONE HYDROCHLORIDE 5 MG/1
10 TABLET ORAL
Status: DISCONTINUED | OUTPATIENT
Start: 2025-08-01 | End: 2025-08-01 | Stop reason: HOSPADM

## 2025-08-01 RX ORDER — LORAZEPAM 2 MG/ML
.5-1 INJECTION INTRAMUSCULAR
Status: DISCONTINUED | OUTPATIENT
Start: 2025-08-01 | End: 2025-08-01 | Stop reason: HOSPADM

## 2025-08-01 RX ORDER — IBUPROFEN 200 MG
600 TABLET ORAL
Status: DISCONTINUED | OUTPATIENT
Start: 2025-08-01 | End: 2025-08-01 | Stop reason: HOSPADM

## 2025-08-01 RX ORDER — DEXAMETHASONE SODIUM PHOSPHATE 10 MG/ML
4 INJECTION, SOLUTION INTRAMUSCULAR; INTRAVENOUS
Status: DISCONTINUED | OUTPATIENT
Start: 2025-08-01 | End: 2025-08-01 | Stop reason: HOSPADM

## 2025-08-01 RX ORDER — HYDROCODONE BITARTRATE AND ACETAMINOPHEN 5; 325 MG/1; MG/1
1-2 TABLET ORAL EVERY 4 HOURS PRN
Qty: 15 TABLET | Refills: 0 | Status: SHIPPED | OUTPATIENT
Start: 2025-08-01

## 2025-08-01 RX ADMIN — OXYCODONE HYDROCHLORIDE 5 MG: 5 TABLET ORAL at 14:42

## 2025-08-01 RX ADMIN — SODIUM CHLORIDE, SODIUM LACTATE, POTASSIUM CHLORIDE, AND CALCIUM CHLORIDE: .6; .31; .03; .02 INJECTION, SOLUTION INTRAVENOUS at 06:33

## 2025-08-01 RX ADMIN — ACETAMINOPHEN 650 MG: 325 TABLET ORAL at 09:28

## 2025-08-01 RX ADMIN — METRONIDAZOLE 500 MG: 500 INJECTION, SOLUTION INTRAVENOUS at 05:42

## 2025-08-01 RX ADMIN — SODIUM CHLORIDE, SODIUM LACTATE, POTASSIUM CHLORIDE, AND CALCIUM CHLORIDE: .6; .31; .03; .02 INJECTION, SOLUTION INTRAVENOUS at 12:00

## 2025-08-01 RX ADMIN — CEFTRIAXONE SODIUM 1 G: 1 INJECTION, POWDER, FOR SOLUTION INTRAMUSCULAR; INTRAVENOUS at 10:25

## 2025-08-01 ASSESSMENT — ACTIVITIES OF DAILY LIVING (ADL)
ADLS_ACUITY_SCORE: 23
ADLS_ACUITY_SCORE: 27
ADLS_ACUITY_SCORE: 23
DEPENDENT_IADLS:: INDEPENDENT
ADLS_ACUITY_SCORE: 27
ADLS_ACUITY_SCORE: 18
ADLS_ACUITY_SCORE: 27
ADLS_ACUITY_SCORE: 23
ADLS_ACUITY_SCORE: 18
ADLS_ACUITY_SCORE: 23
ADLS_ACUITY_SCORE: 27
ADLS_ACUITY_SCORE: 18
ADLS_ACUITY_SCORE: 18
ADLS_ACUITY_SCORE: 27
ADLS_ACUITY_SCORE: 23
ADLS_ACUITY_SCORE: 27

## 2025-08-01 NOTE — PROGRESS NOTES
"[facility]    Medicine Progress Note - Hospitalist Service    Date of Admission:  7/31/2025    Assessment & Plan    Katie Harvey is a 55F who presents with epigastric abdominal discomfort, nausea, vomiting that started last night.  Pain persisted and she presented to the [facility] room where she was found to have found to have elevated LFTs (T.bili 1.4, Alk phos 162, , ) and markedly elevated lipase (20k).  CT with contrast and US with mild CBD dilation and possible cholecystitis.   Received a dose of ceftraixone.  No CT evidence for pancreatitis reported despite markedly elevated lipase.  Surgery consulted, plan for patient n.p.o. for cholecystectomy with IOC today.  If okay with surgery team patient okay to discharge from PACU.       #Gallstone Pancreatitis  #Possible cholecystitis and choledocholithiasis.  # Elevated LFTs  # Elevated lipase  IV ceftriaxone and flagyl (PCN allergy),  LR bolus 500 mL given on admission   ml LR  NPO  Pain control-IV Dilaudid  Surgery consult recommendations:NPO diet. OR today for laparoscopic cholecystectomy with IOC  LFTs elevated trending down from yesterday  Lipase 241 today trending down from yesterday  GI consult recommendations pending      #Incidental finding   - thickened uterine endometrium.  Outpatient follow-up with GYN.              Diet: NPO for Procedure/Surgery per Anesthesia Guidelines Except for: Meds; Clear liquids before procedure/surgery: ADULT (Age GREATER than or Equal to 18 years) - Clear liquids 2 hours before procedure/surgery    DVT Prophylaxis: Pneumatic Compression Devices  Mantilla Catheter: Not present  Lines: None     Cardiac Monitoring: None  Code Status: Full Code      Clinically Significant Risk Factors Present on Admission                             # Obesity: Estimated body mass index is 33.72 kg/m  as calculated from the following:    Height as of this encounter: 1.689 m (5' 6.5\").    Weight as of " this encounter: 96.2 kg (212 lb 1.3 oz).       # Asthma: noted on problem list        Social Drivers of Health            Disposition Plan     Medically Ready for Discharge: Anticipated Tomorrow         The patient's care was discussed with the Attending Physician, Dr. Wade.    Katie Vanegas NP  Hospitalist Service  [facility]  Securely message with VSS Monitoring (more info)  Text page via [employer] Paging/Directory   ______________________________________________________________________    Interval History   Patient feels improved today  Mild abdominal pain and tenderness  Denies nausea vomiting or diarrhea  N.p.o. for cholecystectomy.today      Physical Exam   Vital Signs: Temp: 98  F (36.7  C) Temp src: Oral BP: (!) 146/84 Pulse: 83   Resp: 16 SpO2: 95 % O2 Device: None (Room air)    Weight: 212 lbs 1.32 oz    Constitutional: awake, alert, cooperative, no apparent distress, and appears stated age  Hematologic / Lymphatic: no cervical lymphadenopathy and no supraclavicular lymphadenopathy  Respiratory: No increased work of breathing, good air exchange, clear to auscultation bilaterally, no crackles or wheezing  Cardiovascular: Normal apical impulse, regular rate and rhythm, normal S1 and S2, no S3 or S4, and no murmur noted  GI: No scars, normal bowel sounds, soft, non-distended, mild generalized abd tender, no masses palpated, no hepatosplenomegally  Skin: no bruising or bleeding, normal skin color, texture, turgor, and no redness, warmth, or swelling  Musculoskeletal: There is no redness, warmth, or swelling of the joints.   Neurologic: Awake, alert, oriented to name, place and time.  Neuropsychiatric: General: normal, calm, and normal eye contact    Medical Decision Making       50 MINUTES SPENT BY ME on the date of service doing chart review, history, exam, documentation & further activities per the note.      Data     I have personally reviewed the following data over the past 24 hrs:    8.1  \    12.6   / 248     140 104 12.8 /  95   3.8 26 0.69 \     ALT: 94 (H) AST: 68 (H) AP: 122 TBILI: 1.3 (H)   ALB: 3.5 TOT PROTEIN: 6.7 LIPASE: 241 (H)       Imaging results reviewed over the past 24 hrs:   No results found for this or any previous visit (from the past 24 hours).

## 2025-08-01 NOTE — PLAN OF CARE
Problem: Adult Inpatient Plan of Care  Goal: Optimal Comfort and Wellbeing  Outcome: Progressing     Problem: Pain Acute  Goal: Optimal Pain Control and Function  Outcome: Progressing  Intervention: Prevent or Manage Pain  Recent Flowsheet Documentation  Taken 7/31/2025 2359 by Kaelyn Champion RN  Medication Review/Management: medications reviewed     Problem: Pancreatitis  Goal: Fluid and Electrolyte Balance  Outcome: Progressing  Goal: Optimal Pain Control and Function  Outcome: Progressing    Problem: Comorbidity Management  Goal: Maintenance of Asthma Control  Outcome: Progressing  Intervention: Maintain Asthma Symptom Control  Recent Flowsheet Documentation  Taken 7/31/2025 2359 by Kaelyn Champion RN  Medication Review/Management: medications reviewed       Goal Outcome Evaluation:         Pt is A&Ox4. Denies pain, nausea, or shortness of breath. Up in room independently. Voiding without difficulty. Afebrile. No acute events overnight. NPO.

## 2025-08-01 NOTE — PROGRESS NOTES
"General Surgery Progress Note:    Hospital Day # 1    ASSESSMENT:   1. Gallstone pancreatitis        Katie Harvey is a 55 year old female who is admitted for gallstone pancreatitis.     AM labs notable for persistent hyperbilirubinemia and transaminitis, slight downtrend in AST/ALT levels. Lipase levels with significant improvement. On exam, she is well appearing. Still having epigastric/mid abdominal soreness. She is HDS, AF.     Plan for laparoscopic cholecystectomy with IOC for which she is amenable to proceed with.     PLAN:   -NPO diet  -OR today for laparoscopic cholecystectomy with IOC    SUBJECTIVE:   Katie Harvey is reporting feeling okay. Pain improving to mid abdomen, reported as discomfort. Denies fever, chills sensation. Discussed laparoscopic cholecystectomy and IOC for which she is amenable to proceed with.     Patient Vitals for the past 24 hrs:   BP Temp Temp src Pulse Resp SpO2 Height Weight   08/01/25 0713 133/78 98.1  F (36.7  C) Oral 72 16 95 % -- --   08/01/25 0403 118/74 97.8  F (36.6  C) Oral 79 16 97 % -- --   07/31/25 2320 117/56 97.8  F (36.6  C) Oral 66 16 95 % -- --   07/31/25 1640 136/74 98.7  F (37.1  C) Oral -- 18 -- -- 96.2 kg (212 lb 1.3 oz)   07/31/25 1327 139/88 98  F (36.7  C) -- 103 20 96 % 1.689 m (5' 6.5\") 94.8 kg (209 lb)       Physical Exam:  General: patient seen resting in bed, no acute distress  Resp: no respiratory distress, breathing comfortably on room air.   Abdomen: soft, non distended with mid abdominal TTP without rebound or guarding.   Extremities: warm and well perfused    Admission on 07/31/2025   Component Date Value    Magnesium 07/31/2025 1.8     Creatinine 07/31/2025 0.69     GFR Estimate 07/31/2025 >90     Magnesium 07/31/2025 1.8     Potassium 07/31/2025 3.6     Lipase 08/01/2025 241 (H)     Protein Total 08/01/2025 6.7     Albumin 08/01/2025 3.5     Bilirubin Total 08/01/2025 1.3 (H)     Alkaline Phosphatase 08/01/2025 122     AST 08/01/2025 68 " (H)     ALT 08/01/2025 94 (H)     Bilirubin Direct 08/01/2025 0.33 (H)     WBC Count 08/01/2025 8.1     RBC Count 08/01/2025 4.39     Hemoglobin 08/01/2025 12.6     Hematocrit 08/01/2025 38.3     MCV 08/01/2025 87     MCH 08/01/2025 28.7     MCHC 08/01/2025 32.9     RDW 08/01/2025 13.5     Platelet Count 08/01/2025 248     Sodium 08/01/2025 140     Potassium 08/01/2025 3.8     Chloride 08/01/2025 104     Carbon Dioxide (CO2) 08/01/2025 26     Anion Gap 08/01/2025 10     Urea Nitrogen 08/01/2025 12.8     Creatinine 08/01/2025 0.69     GFR Estimate 08/01/2025 >90     Calcium 08/01/2025 9.2     Glucose 08/01/2025 95     Magnesium 08/01/2025 1.9         Chanel Benedict PA-C  [facility]  Surgery [address]  [address]  [place] [address]?  Office: [phone]

## 2025-08-01 NOTE — OP NOTE
Operative Note    Name:  Katie Harvey  PCP:  [facility]  Procedure Date:  8/1/2025       Procedure:  Procedure(s):  LAPAROSCOPIC CHOLECYSTECTOMY  WITH INTRAOPERATIVE CHOLANGIOGRAMS     Pre-Procedure Diagnosis:  Gallstone pancreatitis [K85.10]     Post-Procedure Diagnosis:    Same     Surgeon(s):  Mckenna Ballard MD     Assistant: None      Anesthesia Type:  General     Indications:  Presented with gallstone pancreatitis yesterday.  Her lipase has nearly normalized overnight and her LFTs are improving.      Findings:   Normal intraoperative cholangiogram.    Operative Report:    The patient was brought to the operating suite where she was placed in the supine position.  She was prepped and draped in a sterile fashion after general anesthesia was administered.  A small incision was made below the umbilicus and the Veress needle passed into the abdominal cavity which was insufflated with carbon dioxide.  A 5 mm trocar port was then placed followed by the camera.  Under direct vision a 10 mm port was placed in the epigastric region.  Two 5 mm ports were placed in the right upper quadrant under direct vision.  With traction on the gallbladder dissection was carried out in the Jud of [place] and the cystic duct and artery were easily identified and dissected free.  A clip was placed up against the gallbladder and a small enterotomy made in the cystic duct.  Cholangiocatheter was passed through separate stab incision and passed into the duct and held firm with a clip.  Cholangiograms were obtained which showed no filling defect and good flow into the duodenum.  There was some narrowing of the distal common duct consistent with a recent passed stone.  The camera was then replaced and the catheter removed from the duct.  The duct was then clipped and divided as was the artery.  The gallbladder was removed from the gallbladder bed with electrocautery with no difficulty and pulled up through the  epigastric incision.   The port was replaced and the abdomen was irrigated until clear.  Hemostasis was assured.  All  the ports were removed and each site closed with subcuticular sutures of 4-0 Monocryl.  Each site was infiltrated with quarter percent Marcaine.  Sterile dressings were placed.  The patient tolerated the procedure well.      Estimated Blood Loss:   10 cc    Specimens:    ID Type Source Tests Collected by Time Destination   1 : Gallbladder Tissue Gallbladder SURGICAL PATHOLOGY EXAM Mckenna Ballard MD 8/1/2025  1:03 PM            Drains:   GI Enteral Access Device Mouth, center Gastric 16 fr (Active)       Complications:    None    Mckenna Ballard MD     Date: 8/1/2025  Time: 1:20 PM

## 2025-08-01 NOTE — CONSULTS
Care Management Initial Consult    General Information  Assessment completed with: Patient,    Type of CM/SW Visit: Initial Assessment    Primary Care Provider verified and updated as needed:  (needs to establish with pcp)   Readmission within the last 30 days: no previous admission in last 30 days         Advance Care Planning: Advance Care Planning Reviewed:  (no HCD, forms provided)          Communication Assessment  Patient's communication style: spoken language (English or Bilingual)    Hearing Difficulty or Deaf: no   Wear Glasses or Blind: yes    Cognitive  Cognitive/Neuro/Behavioral: WDL                      Living Environment:   People in home: child(shelley), dependent     Current living Arrangements: [facility] home      Able to return to prior arrangements: yes       Family/Social Support:  Care provided by: self  Provides care for: child(shelley)  Marital Status:   Support system: Friend          Description of Support System: Supportive         Current Resources:   Patient receiving home care services: No        Community Resources: None  Equipment currently used at home: none  Supplies currently used at home: None    Employment/Financial:  Employment Status: employed full-time        Financial Concerns:             Does the patient's insurance plan have a 3 day qualifying hospital stay waiver?  No    Lifestyle & Psychosocial Needs:  Social Drivers of Health     Food Insecurity: Low Risk  (7/31/2025)    Food Insecurity     Within the past 12 months, did you worry that your food would run out before you got money to buy more?: No     Within the past 12 months, did the food you bought just not last and you didn t have money to get more?: No   Depression: Not at risk (7/18/2022)    Received from [facility]    PHQ-2     PHQ-2 Score: 0   Housing Stability: Low Risk  (7/31/2025)    Housing Stability     Do you have housing? : Yes     Are you worried about losing your housing?: No   Tobacco Use: Low Risk   (7/31/2025)    Patient History     Smoking Tobacco Use: Never     Smokeless Tobacco Use: Never     Passive Exposure: Not on file   Financial Resource Strain: Low Risk  (7/31/2025)    Financial Resource Strain     Within the past 12 months, have you or your family members you live with been unable to get utilities (heat, electricity) when it was really needed?: No   Alcohol Use: Not on file   Transportation Needs: Low Risk  (7/31/2025)    Transportation Needs     Within the past 12 months, has lack of transportation kept you from medical appointments, getting your medicines, non-medical meetings or appointments, work, or from getting things that you need?: No   Physical Activity: Not on file   Interpersonal Safety: Low Risk  (7/31/2025)    Interpersonal Safety     Do you feel physically and emotionally safe where you currently live?: Yes     Within the past 12 months, have you been hit, slapped, kicked or otherwise physically hurt by someone?: No     Within the past 12 months, have you been humiliated or emotionally abused in other ways by your partner or ex-partner?: No   Stress: Not on file   Social Connections: Not on file   Health Literacy: Not on file       Functional Status:  Prior to admission patient needed assistance:   Dependent ADLs:: Independent  Dependent IADLs:: Independent       Mental Health Status:          Chemical Dependency Status:                Values/Beliefs:  Spiritual, Cultural Beliefs, Yarsani Practices, Values that affect care:                 Discussed  Partnership in Safe Discharge Planning  document with patient/family: No    Additional Information:    Assessment completed with patient. Patient reports she lives in a town house with her 13 year old daughter (daughter currently with the dad). Patient is independent with ADLs/IADLs, ambulates without devices and has no services in community.  She states her friend Louise would be ER contact if needed. She endorses having transportation at  discharge.      Next Steps:     Follow for progression and recommendations.      Annie Sotomayor RN

## 2025-08-01 NOTE — DISCHARGE SUMMARY
"[facility]  Hospitalist Discharge Summary      Date of Admission:  7/31/2025  Date of Discharge:  8/1/2025  Discharging Provider: Katie Vanegas NP  Discharge Service: Hospitalist Service    Discharge Diagnoses   Cholecystitis  Gallstone pancreatitis  Postop cholecystectomy    Clinically Significant Risk Factors     # Obesity: Estimated body mass index is 33.72 kg/m  as calculated from the following:    Height as of this encounter: 1.689 m (5' 6.5\").    Weight as of this encounter: 96.2 kg (212 lb 1.3 oz).       Follow-ups Needed After Discharge       Unresulted Labs Ordered in the Past 30 Days of this Admission       Date and Time Order Name Status Description    8/1/2025  1:07 PM Surgical Pathology Exam In process             Discharge Disposition   Discharged to home  Condition at discharge: Stable    Hospital Course   Katie Harvey is a 55F who presents with epigastric abdominal discomfort, nausea, vomiting that started last night.  Pain persisted and she presented to the Urgency room where she was found to have found to have elevated LFTs (T.bili 1.4, Alk phos 162, , ) and markedly elevated lipase (20k).  CT with contrast and US with mild CBD dilation and possible cholecystitis.   Received a dose of ceftraixone.  No CT evidence for pancreatitis reported despite markedly elevated lipase.  Surgery consulted, plan for patient n.p.o. for cholecystectomy with IOC today.  Patient discharged by surgery team from PACU    #Gallstone Pancreatitis  #Possible cholecystitis and choledocholithiasis.  # Elevated LFTs  # Elevated lipase  IV ceftriaxone and flagyl (PCN allergy),  LR bolus 500 mL given on admission   ml LR  NPO  Pain control-IV Dilaudid  Surgery consult recommendations:NPO diet. OR today for laparoscopic cholecystectomy with IOC  LFTs elevated trending down from yesterday  Lipase 241 today trending down from yesterday  GI consult recommendations pending      " #Incidental finding   - thickened uterine endometrium.  Outpatient follow-up with GYN.          Consultations This Hospital Stay   GASTROENTEROLOGY IP CONSULT  CARE MANAGEMENT / SOCIAL WORK IP CONSULT  GASTROENTEROLOGY IP CONSULT    Code Status   Full Code         Katie Vanegas NP  [facility] PHASE II  [facility] [facility] [address]  Phone: [phone]  Fax: [phone]  ______________________________________________________________________    Physical Exam   Vital Signs: Temp: 97  F (36.1  C) Temp src: Temporal BP: 113/62 Pulse: 81   Resp: 19 SpO2: 94 % O2 Device: None (Room air) Oxygen Delivery: 2 LPM  Weight: 212 lbs 1.32 oz  Constitutional: awake, alert, cooperative, no apparent distress, and appears stated age  Hematologic / Lymphatic: no cervical lymphadenopathy and no supraclavicular lymphadenopathy  Respiratory: No increased work of breathing, good air exchange, clear to auscultation bilaterally, no crackles or wheezing  Cardiovascular: Normal apical impulse, regular rate and rhythm, normal S1 and S2, no S3 or S4, and no murmur noted  GI: No scars, normal bowel sounds, soft, non-distended, non-tender, no masses palpated, no hepatosplenomegally  Skin: no bruising or bleeding and normal skin color, texture, turgor  Musculoskeletal: There is no redness, warmth, or swelling of the joints.  Full range of motion noted.  Motor strength is 5 out of 5 all extremities bilaterally.  Tone is normal.  Neurologic: Awake, alert, oriented to name, place and time.  Cranial nerves II-XII are grossly intact.  Motor is 5 out of 5 bilaterally.  Cerebellar finger to nose, heel to shin intact.  Sensory is intact.  Babinski down going, Romberg negative, and gait is normal.  Neuropsychiatric: General: normal, calm, and normal eye contact       Primary Care Physician   [facility]    Discharge Orders      When to call - Contact Surgeon Team    You may experience symptoms that require  follow-up before your scheduled appointment. Contact your Surgeon Team if you are concerned about pain control, large amount of bleeding, blood clots, constipation, or if you experience signs of infection (fever, growing tenderness at the surgery site, a large amount of drainage, severe pain, foul-smelling drainage, redness or swelling.  Please call [phone] if you do have any questions or concerns     When to call - Reasons to Call 911    Call 911 immediately if you experience sudden-onset chest pain, arm weakness/numbness, slurred speech, or shortness of breath     Symptoms - Fever Management    A low grade fever can be expected after surgery. Your Provider many have prescribed an Opioid pain medication that also contains acetaminophen (TYLENOL) that may help with Fever management.  Do NOT take additional acetaminophen (TYLENOL) in combination with an Opioid/acetaminophen (TYLENOL) product. Read the labels on your Over The Counter (OTC) medications with care.     Diet Instructions    You may drink clear liquids (apple juice, ginger ale, 7-up, broth, etc.), and progress to your regular diet as you feel able. It is important to stay well-hydrated after surgery and drink plenty of water.     Shower/Bathing - Restrictions: Let water run over incisions and pat dry. No tub baths until bleeding stops.    Shower/Bathing - Restrictions: Let water run over incisions and pat dry. Do NOT soak in any body of water (lake, pool, bath, etc.) for 7-10 days postoperatively.     Dressing / Wound Care - Wound    Remove Band-Aids in 1 to 2 days.  Leave Steri-Strips for 7 to 10 days.  The sutures are underneath the skin and will dissolve on their own.     Discharge Instructions - Comfort and Pain Management    Pain after surgery is normal and expected. You will have some amount of pain after surgery. Your pain will improve with time. There are several things you can do to help reduce your pain including: rest, ice, and using pain  medications as needed. Use pain interventions and don't wait until pain level is out of control. Contact your Surgeon Team if you have pain that persists or worsens after surgery despite rest, ice, and taking your medication(s) as prescribed. You may have a dry mouth, a sore throat, muscles aches or trouble sleeping, and these symptoms should go away after 24 hours.     Discharge Instructions - Rest    Rest and relax for the next 24 hours. Make arrangements to have someone stay with you overnight, and avoid hazardous and strenuous activities.  Do NOT make any important decisions for the next 24 hours.     Return to normal activity as tolerated    Return to normal activity as tolerated     May return to work (WITHOUT restrictions)    May return to work as you feel up to it.  Everyone is different.  Average time off work is approximately 1 week.       Significant Results and Procedures   Most Recent 3 CBC's:  Recent Labs   Lab Test 08/01/25 0520   WBC 8.1   HGB 12.6   MCV 87        Most Recent 3 BMP's:  Recent Labs   Lab Test 08/01/25 0520 07/31/25  1735     --    POTASSIUM 3.8 3.6   CHLORIDE 104  --    CO2 26  --    BUN 12.8  --    CR 0.69 0.69   ANIONGAP 10  --    DES 9.2  --    GLC 95  --      Most Recent 2 LFT's:  Recent Labs   Lab Test 08/01/25 0520   AST 68*   ALT 94*   ALKPHOS 122   BILITOTAL 1.3*   ,   Results for orders placed or performed during the hospital encounter of 07/31/25   XR Surgery PAM Fluoro L/T 5 Min    Narrative    This exam was marked as non-reportable because it will not be read by a   radiologist or a [facility] non-radiologist provider.             Discharge Medications      Review of your medicines        START taking        Dose / Directions   HYDROcodone-acetaminophen 5-325 MG tablet  Commonly known as: NORCO      Dose: 1-2 tablet  Take 1-2 tablets by mouth every 4 hours as needed for moderate to severe pain.  Quantity: 15 tablet  Refills: 0            CONTINUE these  medicines which have NOT CHANGED        Dose / Directions   azelastine 0.05 % ophthalmic solution  Commonly known as: OPTIVAR      Dose: 1 drop  Place 1 drop into both eyes 2 times daily.  Refills: 0               Where to get your medicines        These medications were sent to [id number] [place] - [address]  [address], [facility] [address]      Phone: [phone]   HYDROcodone-acetaminophen 5-325 MG tablet       Allergies   Allergies   Allergen Reactions    Pcn [Penicillins] Other (See Comments)     Childhood reaction    Sulfa Antibiotics Rash

## 2025-08-01 NOTE — PLAN OF CARE
Patient admitted to room 03 at approximately 1432 via cart from surgery.  Reason for Admission:  Abdominal pain N/V  Report received from: SHAHRIAR Barth  Patient was accompanied by surgery staff.  Patient ambulated/transferred:  Independently  Patient is alert and orientated x 4.  Outpatient Observation education provided to: (patient, family, friend) NA  MDRO Education done if applicable (MRSA, VRE, etc) NA  Safety risks were identified during admission:  none.   Yellow risk/fall band applied:  NA  Home meds sent home: NA  Detailed Belongings: cell phone

## 2025-08-01 NOTE — PLAN OF CARE
[facility] 6869-4759    Problem: Pain Acute  Goal: Optimal Pain Control and Function  Outcome: Progressing  Intervention: Prevent or Manage Pain  Recent Flowsheet Documentation  Taken 7/31/2025 2012 by Jose Miguel Lane, RN  Medication Review/Management: medications reviewed     Goal Outcome Evaluation:      Plan of Care Reviewed With: patient      A/ox4. RA. C/o abd pain. 1x prn tylernol given. GI/CM following. R PIV running NS 150ml/hr. NPO. Up indep. Continue poc.

## 2025-08-04 ENCOUNTER — TELEPHONE (OUTPATIENT)
Dept: SURGERY | Facility: CLINIC | Age: 55
End: 2025-08-04
Payer: COMMERCIAL

## 2025-08-07 LAB
PATH REPORT.COMMENTS IMP SPEC: NORMAL
PATH REPORT.COMMENTS IMP SPEC: NORMAL
PATH REPORT.FINAL DX SPEC: NORMAL
PATH REPORT.GROSS SPEC: NORMAL
PATH REPORT.MICROSCOPIC SPEC OTHER STN: NORMAL
PATH REPORT.RELEVANT HX SPEC: NORMAL
PHOTO IMAGE: NORMAL

## 2025-08-12 ENCOUNTER — DOCUMENTATION ONLY (OUTPATIENT)
Dept: SURGERY | Facility: CLINIC | Age: 55
End: 2025-08-12
Payer: COMMERCIAL

## 2025-08-14 NOTE — PROGRESS NOTES
Addendum to Discharge Summary dated 8/1/2025    # RESECTED GALLBLADDER:        -  CHRONIC CHOLECYSTITIS    Katie Vanegas, NP

## (undated) DEVICE — SYR 30ML LL W/O NDL 302832

## (undated) DEVICE — CATH CHOLANGIOGRAM 4.5FR TAUT METAL TIP 20018-M55

## (undated) DEVICE — PREP CHLORAPREP 26ML TINTED HI-LITE ORANGE 930815

## (undated) DEVICE — SUCTION MANIFOLD NEPTUNE 2 SYS 1 PORT 702-025-000

## (undated) DEVICE — NDL INSUFFLATION 13GA 120MM C2201

## (undated) DEVICE — ENDO SHEARS RENEW LAP ENDOCUT SCISSOR TIP 16.5MM 3142

## (undated) DEVICE — SUCTION STRYKERFLOW II 250-070-500

## (undated) DEVICE — ENDO TROCAR SLEEVE KII Z-THREADED 05X100MM CTS02

## (undated) DEVICE — SU MONOCRYL+ 4-0 18IN PS2 UND MCP496G

## (undated) DEVICE — ENDO TROCAR FIRST ENTRY KII FIOS Z-THRD 05X100MM CTF03

## (undated) DEVICE — SOLUTION WATER 1000ML BOTTLE R5000-01

## (undated) DEVICE — CUSTOM PACK LAP CHOLE SBA5BLCHEA

## (undated) DEVICE — ENDO TROCAR FIRST ENTRY KII FIOS Z-THRD 11X100MM CTF33

## (undated) DEVICE — Device

## (undated) DEVICE — TUBING SMOKE EVAC PNEUMOCLEAR HIGH FLOW 0620050250

## (undated) DEVICE — DRAPE C-ARM 60X42" 1013

## (undated) DEVICE — ESU GROUND PAD ADULT REM W/15' CORD E7507DB

## (undated) DEVICE — GOWN LG DISP 9515

## (undated) DEVICE — CLIP APPLIER ENDO ROTATING 10MM MED/LG ER320

## (undated) DEVICE — SOL RINGERS LACTATED 1000ML BAG 2B2324X

## (undated) DEVICE — GLOVE PI ULTRATCH M LF SZ 6.5 PF CUFF TEXT STRL LF 42665

## (undated) DEVICE — CATH IV 14GA 2IN REM FLASHPLUG DEHP-FR PVC FR 4251717-02

## (undated) DEVICE — CONTAINER URINE SPEC 4OZ STRL 1053

## (undated) DEVICE — VIAL DECANTER STERILE WHITE DYNJDEC06

## (undated) RX ORDER — CEFAZOLIN SODIUM 1 G/3ML
INJECTION, POWDER, FOR SOLUTION INTRAMUSCULAR; INTRAVENOUS
Status: DISPENSED
Start: 2025-08-01

## (undated) RX ORDER — FENTANYL CITRATE 50 UG/ML
INJECTION, SOLUTION INTRAMUSCULAR; INTRAVENOUS
Status: DISPENSED
Start: 2025-08-01

## (undated) RX ORDER — BUPIVACAINE HYDROCHLORIDE 2.5 MG/ML
INJECTION, SOLUTION INFILTRATION; PERINEURAL
Status: DISPENSED
Start: 2025-07-31

## (undated) RX ORDER — LIDOCAINE HYDROCHLORIDE 10 MG/ML
INJECTION, SOLUTION EPIDURAL; INFILTRATION; INTRACAUDAL; PERINEURAL
Status: DISPENSED
Start: 2025-08-01

## (undated) RX ORDER — ONDANSETRON 2 MG/ML
INJECTION INTRAMUSCULAR; INTRAVENOUS
Status: DISPENSED
Start: 2025-08-01

## (undated) RX ORDER — BUPIVACAINE HYDROCHLORIDE 2.5 MG/ML
INJECTION, SOLUTION EPIDURAL; INFILTRATION; INTRACAUDAL; PERINEURAL
Status: DISPENSED
Start: 2025-08-01

## (undated) RX ORDER — DEXAMETHASONE SODIUM PHOSPHATE 10 MG/ML
INJECTION, SOLUTION INTRAMUSCULAR; INTRAVENOUS
Status: DISPENSED
Start: 2025-08-01